# Patient Record
Sex: FEMALE | Race: WHITE | Employment: FULL TIME | URBAN - METROPOLITAN AREA
[De-identification: names, ages, dates, MRNs, and addresses within clinical notes are randomized per-mention and may not be internally consistent; named-entity substitution may affect disease eponyms.]

---

## 2023-12-14 ENCOUNTER — HOSPITAL ENCOUNTER (OUTPATIENT)
Facility: HOSPITAL | Age: 23
Discharge: HOME | End: 2023-12-14
Attending: INTERNAL MEDICINE | Admitting: RADIOLOGY
Payer: COMMERCIAL

## 2023-12-14 VITALS
RESPIRATION RATE: 19 BRPM | HEART RATE: 102 BPM | DIASTOLIC BLOOD PRESSURE: 86 MMHG | SYSTOLIC BLOOD PRESSURE: 99 MMHG | BODY MASS INDEX: 18.1 KG/M2 | OXYGEN SATURATION: 100 % | WEIGHT: 106 LBS | HEIGHT: 64 IN

## 2023-12-14 DIAGNOSIS — R55 SYNCOPE, UNSPECIFIED SYNCOPE TYPE: ICD-10-CM

## 2023-12-14 LAB
ECHO BSA: 1.47 M2
TILT CV INITIAL SUPINE HEART RATE: 117 BPM
TILT CV INITIAL SUPINE MAX BP: NORMAL BPM
TILT CV INITIAL SUPINE RHYTHM: NORMAL
TILT CV INITIAL TILT BLOOD PRESSURE: NORMAL MMHG
TILT CV INITIAL TILT HEART RATE: 99 BPM
TILT CV INITIAL TILT RHYTHM: NORMAL
TILT CV MAX BP BLOOD PRESSURE: NORMAL MMHG
TILT CV MAX BP HEART RATE: 156 BPM
TILT CV MAX BP MINUTES: 31
TILT CV MAX BP RHYTHM: NORMAL
TILT CV MAX HEART RATE: 156 BPM
TILT CV MAX HR BLOOD PRESSURE: NORMAL MMHG
TILT CV MAX HR MINUTES: 31
TILT CV MAX HR RHYTHM: NORMAL
TILT CV MINIMUM BP BLOOD PRESSURE: NORMAL MMHG
TILT CV MINIMUM BP HEART RATE: 142 BPM
TILT CV MINIMUM BP MINUTES: 40
TILT CV MINIMUM BP RHYTHM: NORMAL
TILT CV MINIMUM HR BP: NORMAL MMHG
TILT CV MINIMUM HR HEART RATE: 99 BPM
TILT CV MINIMUM HR MINUTES: 0
TILT CV MINIMUM HR RHYTHM: NORMAL

## 2023-12-14 PROCEDURE — 6370000000 HC RX 637 (ALT 250 FOR IP): Performed by: INTERNAL MEDICINE

## 2023-12-14 PROCEDURE — 2580000003 HC RX 258: Performed by: INTERNAL MEDICINE

## 2023-12-14 PROCEDURE — 93660 TILT TABLE EVALUATION: CPT

## 2023-12-14 RX ORDER — NITROGLYCERIN 400 UG/1
1 SPRAY ORAL ONCE
Status: COMPLETED | OUTPATIENT
Start: 2023-12-14 | End: 2023-12-14

## 2023-12-14 RX ORDER — 0.9 % SODIUM CHLORIDE 0.9 %
500 INTRAVENOUS SOLUTION INTRAVENOUS ONCE
Status: COMPLETED | OUTPATIENT
Start: 2023-12-14 | End: 2023-12-14

## 2023-12-14 RX ADMIN — NITROGLYCERIN 1 SPRAY: 400 SPRAY ORAL at 08:55

## 2023-12-14 RX ADMIN — SODIUM CHLORIDE 500 ML: 9 INJECTION, SOLUTION INTRAVENOUS at 07:45

## 2023-12-14 NOTE — PROGRESS NOTES
Cath holding summary:    0715: Patient ambulated from waiting area without difficulty, placed on monitor 8. A&O x4, no c/o pain. ID, NPO status, allergies verified. H&P reviewed, med rec completed. PIV x1 inserted, blood sent to lab. Groin prep completed, consent ready for signature. 0915: AVS Discharge instructions reviewed with patient, all questions answered. Patient verbalized understanding, copy given. Procedural site within normal limits, PIV removed. No hematoma or bleeding noted from procedural or IV site. A&Ox4 no c/o pain, discharged with support person in stable condition. Escorted out to vehicle for transport home.

## 2023-12-14 NOTE — DISCHARGE INSTRUCTIONS
Tilt Table Test: About This Test  What is it? A tilt table test is used to check people who have fainted or who often feel lightheaded. The results help your doctor know the cause of your fainting or feeling lightheaded. The test uses a special table that slowly tilts you to an upright position. It checks how your body responds when you change positions. Why is this test done? This test is done to find out why you might be having certain symptoms, such as fainting or feeling lightheaded. Your doctor can see if your symptoms are caused by problems with your heart rate or blood pressure. How can you prepare for the test?  You may be asked to not eat or drink for a few hours before the test.  You may need to have someone drive you home after the test.  Tell your doctor ALL the medicines, vitamins, supplements, and herbal remedies you take. Some may increase the risk of problems during your test. Your doctor will tell you if you should stop taking any of them before the test and how soon to do it. How is the test done? The test is usually done in a hospital or a cardiologist's office. You will have small patches or pads attached to your skin. These are sensors that monitor your heart. You will also have a blood pressure cuff on your arm. And you may have an I.V. During the test, you will lie flat on a table that can tilt you up to almost a standing position. You will be strapped securely to the table. Your heart rate and blood pressure are checked regularly as the table is tilted up. You will be asked if you feel any symptoms like nausea, sweating, dizziness, or an abnormal heartbeat. If you don't have any symptoms, you may be given medicine to speed up your heart rate. Then you will be checked for symptoms again. If you faint during the test, the table will be returned to a flat position. You will be checked closely and taken care of right away by your medical team. Most people wake up right away.   Your